# Patient Record
Sex: FEMALE | Race: WHITE | NOT HISPANIC OR LATINO | Employment: UNEMPLOYED | URBAN - METROPOLITAN AREA
[De-identification: names, ages, dates, MRNs, and addresses within clinical notes are randomized per-mention and may not be internally consistent; named-entity substitution may affect disease eponyms.]

---

## 2024-09-21 ENCOUNTER — APPOINTMENT (OUTPATIENT)
Dept: RADIOLOGY | Facility: HOSPITAL | Age: 24
End: 2024-09-21
Payer: COMMERCIAL

## 2024-09-21 ENCOUNTER — HOSPITAL ENCOUNTER (EMERGENCY)
Facility: HOSPITAL | Age: 24
Discharge: HOME | End: 2024-09-21
Payer: COMMERCIAL

## 2024-09-21 VITALS
SYSTOLIC BLOOD PRESSURE: 134 MMHG | DIASTOLIC BLOOD PRESSURE: 85 MMHG | BODY MASS INDEX: 36.8 KG/M2 | HEIGHT: 62 IN | OXYGEN SATURATION: 98 % | WEIGHT: 200 LBS | HEART RATE: 80 BPM | TEMPERATURE: 96.8 F | RESPIRATION RATE: 16 BRPM

## 2024-09-21 DIAGNOSIS — K80.20 CALCULUS OF GALLBLADDER WITHOUT CHOLECYSTITIS WITHOUT OBSTRUCTION: ICD-10-CM

## 2024-09-21 DIAGNOSIS — K76.0 FATTY LIVER: ICD-10-CM

## 2024-09-21 DIAGNOSIS — R74.01 TRANSAMINITIS: Primary | ICD-10-CM

## 2024-09-21 LAB
ALBUMIN SERPL BCP-MCNC: 4.3 G/DL (ref 3.4–5)
ALP SERPL-CCNC: 122 U/L (ref 33–110)
ALT SERPL W P-5'-P-CCNC: 99 U/L (ref 7–45)
ANION GAP SERPL CALC-SCNC: 11 MMOL/L (ref 10–20)
APPEARANCE UR: CLEAR
AST SERPL W P-5'-P-CCNC: 95 U/L (ref 9–39)
BASOPHILS # BLD AUTO: 0 X10*3/UL (ref 0–0.1)
BASOPHILS NFR BLD AUTO: 0 %
BILIRUB SERPL-MCNC: 0.4 MG/DL (ref 0–1.2)
BILIRUB UR STRIP.AUTO-MCNC: NEGATIVE MG/DL
BUN SERPL-MCNC: 10 MG/DL (ref 6–23)
CALCIUM SERPL-MCNC: 9.1 MG/DL (ref 8.6–10.3)
CHLORIDE SERPL-SCNC: 104 MMOL/L (ref 98–107)
CO2 SERPL-SCNC: 25 MMOL/L (ref 21–32)
COLOR UR: ABNORMAL
CREAT SERPL-MCNC: 0.74 MG/DL (ref 0.5–1.05)
EGFRCR SERPLBLD CKD-EPI 2021: >90 ML/MIN/1.73M*2
EOSINOPHIL # BLD AUTO: 0 X10*3/UL (ref 0–0.7)
EOSINOPHIL NFR BLD AUTO: 0 %
ERYTHROCYTE [DISTWIDTH] IN BLOOD BY AUTOMATED COUNT: 12.5 % (ref 11.5–14.5)
GLUCOSE SERPL-MCNC: 89 MG/DL (ref 74–99)
GLUCOSE UR STRIP.AUTO-MCNC: NORMAL MG/DL
HCG UR QL IA.RAPID: NEGATIVE
HCT VFR BLD AUTO: 38.7 % (ref 36–46)
HGB BLD-MCNC: 13.1 G/DL (ref 12–16)
IMM GRANULOCYTES # BLD AUTO: 0.01 X10*3/UL (ref 0–0.7)
IMM GRANULOCYTES NFR BLD AUTO: 0.2 % (ref 0–0.9)
KETONES UR STRIP.AUTO-MCNC: NEGATIVE MG/DL
LACTATE SERPL-SCNC: 0.6 MMOL/L (ref 0.4–2)
LEUKOCYTE ESTERASE UR QL STRIP.AUTO: NEGATIVE
LIPASE SERPL-CCNC: 14 U/L (ref 9–82)
LYMPHOCYTES # BLD AUTO: 1.42 X10*3/UL (ref 1.2–4.8)
LYMPHOCYTES NFR BLD AUTO: 23.6 %
MCH RBC QN AUTO: 28.5 PG (ref 26–34)
MCHC RBC AUTO-ENTMCNC: 33.9 G/DL (ref 32–36)
MCV RBC AUTO: 84 FL (ref 80–100)
MONOCYTES # BLD AUTO: 0.43 X10*3/UL (ref 0.1–1)
MONOCYTES NFR BLD AUTO: 7.1 %
NEUTROPHILS # BLD AUTO: 4.16 X10*3/UL (ref 1.2–7.7)
NEUTROPHILS NFR BLD AUTO: 69.1 %
NITRITE UR QL STRIP.AUTO: NEGATIVE
NRBC BLD-RTO: 0 /100 WBCS (ref 0–0)
PH UR STRIP.AUTO: 6.5 [PH]
PLATELET # BLD AUTO: 312 X10*3/UL (ref 150–450)
POTASSIUM SERPL-SCNC: 4.2 MMOL/L (ref 3.5–5.3)
PROT SERPL-MCNC: 7.5 G/DL (ref 6.4–8.2)
PROT UR STRIP.AUTO-MCNC: NEGATIVE MG/DL
RBC # BLD AUTO: 4.59 X10*6/UL (ref 4–5.2)
RBC # UR STRIP.AUTO: NEGATIVE /UL
SODIUM SERPL-SCNC: 136 MMOL/L (ref 136–145)
SP GR UR STRIP.AUTO: >1.05
UROBILINOGEN UR STRIP.AUTO-MCNC: NORMAL MG/DL
WBC # BLD AUTO: 6 X10*3/UL (ref 4.4–11.3)

## 2024-09-21 PROCEDURE — 83605 ASSAY OF LACTIC ACID: CPT | Performed by: NURSE PRACTITIONER

## 2024-09-21 PROCEDURE — 74177 CT ABD & PELVIS W/CONTRAST: CPT | Performed by: RADIOLOGY

## 2024-09-21 PROCEDURE — 99285 EMERGENCY DEPT VISIT HI MDM: CPT | Mod: 25

## 2024-09-21 PROCEDURE — 74177 CT ABD & PELVIS W/CONTRAST: CPT

## 2024-09-21 PROCEDURE — 96361 HYDRATE IV INFUSION ADD-ON: CPT

## 2024-09-21 PROCEDURE — 83690 ASSAY OF LIPASE: CPT | Performed by: NURSE PRACTITIONER

## 2024-09-21 PROCEDURE — 36415 COLL VENOUS BLD VENIPUNCTURE: CPT | Performed by: NURSE PRACTITIONER

## 2024-09-21 PROCEDURE — 76705 ECHO EXAM OF ABDOMEN: CPT | Performed by: RADIOLOGY

## 2024-09-21 PROCEDURE — 81003 URINALYSIS AUTO W/O SCOPE: CPT | Performed by: NURSE PRACTITIONER

## 2024-09-21 PROCEDURE — 76705 ECHO EXAM OF ABDOMEN: CPT

## 2024-09-21 PROCEDURE — 2500000004 HC RX 250 GENERAL PHARMACY W/ HCPCS (ALT 636 FOR OP/ED): Performed by: NURSE PRACTITIONER

## 2024-09-21 PROCEDURE — 96374 THER/PROPH/DIAG INJ IV PUSH: CPT | Mod: 59

## 2024-09-21 PROCEDURE — 96375 TX/PRO/DX INJ NEW DRUG ADDON: CPT

## 2024-09-21 PROCEDURE — 80053 COMPREHEN METABOLIC PANEL: CPT | Performed by: NURSE PRACTITIONER

## 2024-09-21 PROCEDURE — 85025 COMPLETE CBC W/AUTO DIFF WBC: CPT | Performed by: NURSE PRACTITIONER

## 2024-09-21 PROCEDURE — 81025 URINE PREGNANCY TEST: CPT | Performed by: NURSE PRACTITIONER

## 2024-09-21 PROCEDURE — 2550000001 HC RX 255 CONTRASTS: Performed by: NURSE PRACTITIONER

## 2024-09-21 RX ORDER — ONDANSETRON HYDROCHLORIDE 2 MG/ML
4 INJECTION, SOLUTION INTRAVENOUS ONCE
Status: COMPLETED | OUTPATIENT
Start: 2024-09-21 | End: 2024-09-21

## 2024-09-21 RX ORDER — SODIUM CHLORIDE, SODIUM LACTATE, POTASSIUM CHLORIDE, CALCIUM CHLORIDE 600; 310; 30; 20 MG/100ML; MG/100ML; MG/100ML; MG/100ML
1000 INJECTION, SOLUTION INTRAVENOUS ONCE
Status: COMPLETED | OUTPATIENT
Start: 2024-09-21 | End: 2024-09-21

## 2024-09-21 RX ORDER — MORPHINE SULFATE 2 MG/ML
2 INJECTION, SOLUTION INTRAMUSCULAR; INTRAVENOUS ONCE
Status: COMPLETED | OUTPATIENT
Start: 2024-09-21 | End: 2024-09-21

## 2024-09-21 ASSESSMENT — PAIN SCALES - GENERAL
PAINLEVEL_OUTOF10: 1
PAINLEVEL_OUTOF10: 0 - NO PAIN
PAINLEVEL_OUTOF10: 0 - NO PAIN

## 2024-09-21 ASSESSMENT — PAIN DESCRIPTION - LOCATION: LOCATION: ABDOMEN

## 2024-09-21 ASSESSMENT — LIFESTYLE VARIABLES
EVER HAD A DRINK FIRST THING IN THE MORNING TO STEADY YOUR NERVES TO GET RID OF A HANGOVER: NO
HAVE YOU EVER FELT YOU SHOULD CUT DOWN ON YOUR DRINKING: NO
HAVE PEOPLE ANNOYED YOU BY CRITICIZING YOUR DRINKING: NO
EVER FELT BAD OR GUILTY ABOUT YOUR DRINKING: NO
TOTAL SCORE: 0

## 2024-09-21 NOTE — ED PROVIDER NOTES
HPI   Chief Complaint   Patient presents with    Abdominal Pain       Patient is a 24-year-old female with past med history of obesity presents ED today due to right-sided abdominal pain.  Patient states been ongoing for the past, started after a meal in the evening and has been intermittent since that time.  Today it is just kind of a dull ache.  She is concerned it may be her gallbladder as she has several family members who have had their gallbladders removed due to gallstones.  Patient denies any nausea or vomiting.  She denies any diarrhea.  She does state that she has been constipated.  She denies any urinary symptoms, vaginal discharge or bleeding.  She denies any possibility pregnancy      History provided by:  Patient   used: No            Patient History   Past Medical History:   Diagnosis Date    Personal history of diseases of the blood and blood-forming organs and certain disorders involving the immune mechanism     History of anemia    Personal history of other mental and behavioral disorders     History of depression    Personal history of other specified conditions     History of headache     No past surgical history on file.  No family history on file.  Social History     Tobacco Use    Smoking status: Not on file    Smokeless tobacco: Not on file   Substance Use Topics    Alcohol use: Not on file    Drug use: Not on file       Physical Exam   ED Triage Vitals [09/21/24 1214]   Temperature Heart Rate Respirations BP   36 °C (96.8 °F) 100 18 148/90      Pulse Ox Temp src Heart Rate Source Patient Position   97 % -- -- --      BP Location FiO2 (%)     -- --       Physical Exam  Vitals and nursing note reviewed.   Constitutional:       General: She is not in acute distress.     Appearance: She is well-developed. She is obese.   HENT:      Head: Normocephalic and atraumatic.   Eyes:      Conjunctiva/sclera: Conjunctivae normal.   Cardiovascular:      Rate and Rhythm: Normal rate and  regular rhythm.      Heart sounds: No murmur heard.  Pulmonary:      Effort: Pulmonary effort is normal. No respiratory distress.      Breath sounds: Normal breath sounds.   Abdominal:      Palpations: Abdomen is soft.      Tenderness: There is abdominal tenderness in the right upper quadrant. There is no right CVA tenderness or left CVA tenderness. Negative signs include Atkins's sign.   Musculoskeletal:         General: No swelling.      Cervical back: Neck supple.   Skin:     General: Skin is warm and dry.      Capillary Refill: Capillary refill takes less than 2 seconds.   Neurological:      Mental Status: She is alert.   Psychiatric:         Mood and Affect: Mood normal.         ED Course & MDM   ED Course as of 09/21/24 1610   Sat Sep 21, 2024   1316 CBC and Auto Differential  CBC stable, no leukocytosis, anemia, or thrombocytopenia [WS]   1345 Lipase  Normal, unlikely pancreatitis [WS]   1346 Lactate  Not elevated [WS]   1346 Comprehensive Metabolic Panel(!)  No electrolyte abnormalities or kidney injury.  Elevated liver enzymes concern for possible liver pathology. [WS]   1441 CT abdomen pelvis w IV contrast  1. No acute intra-abdominal abnormality.      2. Component of constipation with moderate stool filled colon  throughout in particular proximal to mid colon.      3. Gallbladder is unremarkable   [WS]   1545 hCG, Urine, Qualitative  Negative [WS]   1555 Urinalysis with Reflex Culture and Microscopic(!)  Elevated specific gravity consistent with dehydration, no evidence of UTI [WS]   1606 US right upper quadrant  Cholelithiasis without evidence of acute cholecystitis. [WS]      ED Course User Index  [WS] Ralph Clark, APRN-CNP         Diagnoses as of 09/21/24 1610   Transaminitis   Fatty liver   Calculus of gallbladder without cholecystitis without obstruction                 No data recorded     [place] Coma Scale Score: 15 (09/21/24 1215 : Steven Suero, EMT)                           Medical  Decision Making  Differential diagnosis: Intra-abdominal faction, cholecystitis, viral gastroenteritis, constipation, bowel obstruction, appendicitis, UTI, pyelonephritis.  Patient's vital signs are stable.  Patient was given fluids, Zofran, and morphine with significant improvement in her pain.  Patient's urinalysis was negative for UTI does show her to be mildly dehydrated.  Urine pregnancy test was also negative.  Lactate was not elevated, lipase was not elevated, no evidence of pancreatitis.  CMP did show a slight transaminitis, no kidney injury or electrolyte abnormalities.  CBC was stable without leukocytosis, anemia or thrombocytopenia.  CT of the abdomen and pelvis with IV contrast demonstrates no acute intra-abdominal abnormality.  Constipation with moderate stool filled throughout the colon.  Gallbladder is unremarkable, however given her elevated liver enzymes I did obtain an ultrasound of the gallbladder which did show cholelithiasis without cholecystitis or obstruction.  Given patient's improvement in pain I did recommend a cholelithiasis diet and follow-up with general surgery.  For the fatty infiltrates in the liver I did also recommend right follow-up with GI.    Return to ED precautions were discussed with patient and patient verbalized understanding of these.      I discussed the differential, results and discharge plan with the patient.  I emphasized the importance of follow-up with the physician I referred them to in the timeframe recommended.  I explained reasons for the them to return to the Emergency Department. Additional verbal discharge instructions were also given and discussed with them to supplement those generated by the EMR. We also discussed medications that were prescribed (if any) and appropriate use of OTC medications including common side effects and interactions. All questions were addressed.  They understand return precautions and discharge instructions. They expressed  understanding.        Amount and/or Complexity of Data Reviewed  Labs: ordered. Decision-making details documented in ED Course.  Radiology: ordered and independent interpretation performed. Decision-making details documented in ED Course.    Risk  OTC drugs.  Prescription drug management.  Parenteral controlled substances.        Procedure  Procedures     Ralph Clark, BRIELLE-CNP  09/21/24 1268

## 2024-09-21 NOTE — ED TRIAGE NOTES
Pt comes to ED with c/o gallbladder issues x 1 week. Pt states she gets intermittent abdominal pain, nausea, sweating

## 2025-01-22 ENCOUNTER — HOSPITAL ENCOUNTER (EMERGENCY)
Facility: HOSPITAL | Age: 25
Discharge: HOME | End: 2025-01-22
Payer: COMMERCIAL

## 2025-01-22 VITALS
OXYGEN SATURATION: 98 % | HEART RATE: 102 BPM | WEIGHT: 204 LBS | DIASTOLIC BLOOD PRESSURE: 81 MMHG | RESPIRATION RATE: 16 BRPM | BODY MASS INDEX: 37.31 KG/M2 | TEMPERATURE: 97.3 F | SYSTOLIC BLOOD PRESSURE: 138 MMHG

## 2025-01-22 DIAGNOSIS — S61.412A LACERATION OF LEFT HAND WITHOUT FOREIGN BODY, INITIAL ENCOUNTER: Primary | ICD-10-CM

## 2025-01-22 PROCEDURE — 99283 EMERGENCY DEPT VISIT LOW MDM: CPT

## 2025-01-22 PROCEDURE — 12002 RPR S/N/AX/GEN/TRNK2.6-7.5CM: CPT | Performed by: PHYSICIAN ASSISTANT

## 2025-01-22 ASSESSMENT — COLUMBIA-SUICIDE SEVERITY RATING SCALE - C-SSRS
1. IN THE PAST MONTH, HAVE YOU WISHED YOU WERE DEAD OR WISHED YOU COULD GO TO SLEEP AND NOT WAKE UP?: NO
6. HAVE YOU EVER DONE ANYTHING, STARTED TO DO ANYTHING, OR PREPARED TO DO ANYTHING TO END YOUR LIFE?: NO
2. HAVE YOU ACTUALLY HAD ANY THOUGHTS OF KILLING YOURSELF?: NO

## 2025-01-22 NOTE — ED PROVIDER NOTES
Limitations to History: None   External Records Reviewed  Independent Historians: self  Social determinants affecting care: None    HPI  Luz Maria Broderick is a 24 y.o. female who presents to the emergency department due to left hand laceration just prior to the ER visit.  She reports that she was opening a can and the top of the lid cut her left hand.  She is right-hand dominant.  She reports that she has very minimal burning pain.  She is up-to-date on her tetanus immunization.  She denies any loss of function of the fingers, numbness, or tingling.  She denies any other injuries.  She has no further complaints.    [facility]  Past Medical History:   Diagnosis Date    Personal history of diseases of the blood and blood-forming organs and certain disorders involving the immune mechanism     History of anemia    Personal history of other mental and behavioral disorders     History of depression    Personal history of other specified conditions     History of headache    reviewed by myself.    Meds  No current outpatient medications    Allergies  No Known Allergies reviewed by myself.    SHx  Social History     Tobacco Use    Smoking status: Never    Smokeless tobacco: Never    reviewed by myself.      ------------------------------------------------------------------------------------------------------------------------------------------    /81 (BP Location: Right arm, Patient Position: Sitting)   Pulse (!) 102   Temp 36.3 °C (97.3 °F) (Temporal)   Resp 16   Wt 92.5 kg (204 lb)   LMP 01/15/2025 (Approximate)   SpO2 98%   BMI 37.31 kg/m²     Physical Exam  Vitals and nursing note reviewed.   Constitutional:       Appearance: Normal appearance. She is normal weight.   HENT:      Head: Atraumatic.      Nose: Nose normal.      Mouth/Throat:      Mouth: Mucous membranes are moist.   Eyes:      Extraocular Movements: Extraocular movements intact.      Conjunctiva/sclera: Conjunctivae normal.   Cardiovascular:      Rate  and Rhythm: Normal rate and regular rhythm.   Pulmonary:      Effort: Pulmonary effort is normal.      Breath sounds: Normal breath sounds.   Musculoskeletal:         General: Normal range of motion.      Cervical back: Neck supple.      Comments: Full active range of motion of the digit of the left hand with extension and flexion.  Strength the digit of the left hand +5/5 with extension and flexion.  Radial pulse intact.   Skin:     General: Skin is warm and dry.      Comments: Linear 1.5 cm laceration to the palmar surface of the left hand.  Minimal bleeding noted.  No tendon or arterial injury.  Capillary refill is brisk.  Sensation intact distally.   Neurological:      Mental Status: She is alert and oriented to person, place, and time.   Psychiatric:         Mood and Affect: Mood normal.          ------------------------------------------------------------------------------------------------------------------------------------------  Labs  Labs Reviewed - No data to display     Imaging  No orders to display        ED Course  Diagnoses as of 01/22/25 1132   Laceration of left hand without foreign body, initial encounter        Medical Decision Making: She did not appear ill or toxic.  Vital signs reviewed.  She slightly tachycardic.  Otherwise hemodynamically stable.  Wound was cleaned and explored.  No foreign body.  No tendon or arterial injury.  Sutures were placed.  No complications.  I discussed with her to keep the wound clean and dry.  She is to have stitches removed in 7 days.  She was educated on signs symptoms of infection.  She is to return to ER immediately if symptoms change or worsen.  She verbalized understanding and agreed to plan of care.  She was discharged in stable condition.    Diagnosis: Hand laceration  Plan: Discharge     Eligio Acosta PA-C  01/22/25 1132

## 2025-01-22 NOTE — ED PROCEDURE NOTE
Procedure  Laceration Repair    Performed by: Eligio Acosta PA-C  Authorized by: Eligio Acosta PA-C    Consent:     Consent obtained:  Verbal    Consent given by:  Patient    Risks, benefits, and alternatives were discussed: yes      Risks discussed:  Infection, pain, poor cosmetic result and poor wound healing  Universal protocol:     Procedure explained and questions answered to patient or proxy's satisfaction: yes      Relevant documents present and verified: yes      Test results available: yes      Patient identity confirmed:  Verbally with patient  Anesthesia:     Anesthesia method:  Local infiltration    Local anesthetic:  Lidocaine 1% w/o epi  Laceration details:     Location:  Hand    Hand location:  L palm    Length (cm):  4.5  Pre-procedure details:     Preparation:  Patient was prepped and draped in usual sterile fashion  Exploration:     Limited defect created (wound extended): no      Hemostasis achieved with:  Direct pressure    Imaging outcome: foreign body not noted      Wound exploration: wound explored through full range of motion      Contaminated: no    Treatment:     Area cleansed with:  Chlorhexidine and saline    Amount of cleaning:  Standard    Irrigation solution:  Sterile saline    Debridement:  None    Undermining:  None  Skin repair:     Repair method:  Sutures    Suture size:  4-0    Suture material:  Nylon    Suture technique:  Simple interrupted    Number of sutures:  9  Approximation:     Approximation:  Close  Repair type:     Repair type:  Simple  Post-procedure details:     Dressing:  Non-adherent dressing    Procedure completion:  Tolerated well, no immediate complications               Eligio Acosta PA-C  01/22/25 1130

## 2025-01-22 NOTE — DISCHARGE INSTRUCTIONS
Keep wound clean and dry.  Look for signs of infection as we discussed.  Have the stitches removed in 7 days.  Return to ER immediately if symptoms change or worsen

## 2025-01-26 ENCOUNTER — HOSPITAL ENCOUNTER (EMERGENCY)
Facility: HOSPITAL | Age: 25
Discharge: LEFT BEFORE DISCHARGE | End: 2025-01-26
Payer: COMMERCIAL

## 2025-01-26 PROCEDURE — 4500999001 HC ED NO CHARGE

## 2025-01-30 ENCOUNTER — HOSPITAL ENCOUNTER (EMERGENCY)
Facility: HOSPITAL | Age: 25
Discharge: HOME | End: 2025-01-30
Attending: STUDENT IN AN ORGANIZED HEALTH CARE EDUCATION/TRAINING PROGRAM
Payer: COMMERCIAL

## 2025-01-30 VITALS
TEMPERATURE: 97.9 F | WEIGHT: 200 LBS | OXYGEN SATURATION: 98 % | SYSTOLIC BLOOD PRESSURE: 136 MMHG | HEART RATE: 79 BPM | DIASTOLIC BLOOD PRESSURE: 76 MMHG | RESPIRATION RATE: 16 BRPM | BODY MASS INDEX: 36.8 KG/M2 | HEIGHT: 62 IN

## 2025-01-30 DIAGNOSIS — Z48.89 SUTURE CHECK: Primary | ICD-10-CM

## 2025-01-30 PROCEDURE — 99281 EMR DPT VST MAYX REQ PHY/QHP: CPT | Performed by: STUDENT IN AN ORGANIZED HEALTH CARE EDUCATION/TRAINING PROGRAM

## 2025-01-30 ASSESSMENT — PAIN SCALES - GENERAL
PAINLEVEL_OUTOF10: 0 - NO PAIN
PAINLEVEL_OUTOF10: 0 - NO PAIN

## 2025-01-30 ASSESSMENT — PAIN - FUNCTIONAL ASSESSMENT
PAIN_FUNCTIONAL_ASSESSMENT: 0-10
PAIN_FUNCTIONAL_ASSESSMENT: 0-10

## 2025-01-30 ASSESSMENT — LIFESTYLE VARIABLES
TOTAL SCORE: 0
HAVE PEOPLE ANNOYED YOU BY CRITICIZING YOUR DRINKING: NO
HAVE YOU EVER FELT YOU SHOULD CUT DOWN ON YOUR DRINKING: NO
EVER FELT BAD OR GUILTY ABOUT YOUR DRINKING: NO
EVER HAD A DRINK FIRST THING IN THE MORNING TO STEADY YOUR NERVES TO GET RID OF A HANGOVER: NO

## 2025-01-30 ASSESSMENT — COLUMBIA-SUICIDE SEVERITY RATING SCALE - C-SSRS
2. HAVE YOU ACTUALLY HAD ANY THOUGHTS OF KILLING YOURSELF?: NO
6. HAVE YOU EVER DONE ANYTHING, STARTED TO DO ANYTHING, OR PREPARED TO DO ANYTHING TO END YOUR LIFE?: NO
1. IN THE PAST MONTH, HAVE YOU WISHED YOU WERE DEAD OR WISHED YOU COULD GO TO SLEEP AND NOT WAKE UP?: NO

## 2025-01-31 NOTE — ED PROVIDER NOTES
EMERGENCY DEPARTMENT ENCOUNTER      Pt Name: Luz Maria Broderick  MRN: [id number]  Birthdate 2000  Date of evaluation: 1/30/2025  Provider: Elkin Alvarado DO    CHIEF COMPLAINT       Chief Complaint   Patient presents with    Wound Check     PT. STATES GOT STITCHES TO LEFT HAND WEEK AGO WEDNESDAY AFTER CUTTING IT ON A METAL CAN LID. PT. STATES IT GOT INFECTED OVER THE WEEKEND, GOT ANTIBX FROM URGENT CARE. PT. STATES STITCHES OPENED TO ONE END OF WOUND COUPLE DAYS AGO.        HISTORY OF PRESENT ILLNESS    Luz Maria Broderick is a 24 y.o. female who presents to the emergency department with Her self for suture concern.  Patient states that she suffered a laceration to the palm of her left hand days prior had it repaired with sutures and was subsequently placed on antibiotic prophylaxis with Keflex for which she has been compliant with.  There are no signs of infection today.  She notes dehiscence of the wound itself although still well-healing.  No further related symptoms at this time.          Nursing Notes were reviewed.    REVIEW OF SYSTEMS     CONSTITUTIONAL: Denies fever, sweats, chills.   NEURO: Denies difficulty walking, numbness, weakness, tingling, headache.   HEENT: Denies sore throat, rhinorrhea, changes in vision.   CARDIO: Denies chest pain, palpitations.  PULM: Denies shortness of breath, cough.   GI: Denies abdominal pain, nausea, vomiting  MSK: Denies recent trauma.   SKIN: Endorses dehiscence of wound.  ENDOCRINE: Denies unexpected weight-loss.   HEME: Denies bleeding disorder.     PAST MEDICAL HISTORY     Past Medical History:   Diagnosis Date    Personal history of diseases of the blood and blood-forming organs and certain disorders involving the immune mechanism     History of anemia    Personal history of other mental and behavioral disorders     History of depression    Personal history of other specified conditions     History of headache       SURGICAL HISTORY     History reviewed. No pertinent  surgical history.    ALLERGIES     Patient has no known allergies.    FAMILY HISTORY     No family history on file.     SOCIAL HISTORY       Social History     Socioeconomic History    Marital status: Single   Tobacco Use    Smoking status: Never    Smokeless tobacco: Never   Vaping Use    Vaping status: Never Used   Substance and Sexual Activity    Alcohol use: Not Currently    Drug use: Not Currently     Social Drivers of Health     Financial Resource Strain: Low Risk  (12/30/2024)    Received from [facility]    Overall Financial Resource Strain (CARDIA)     Difficulty of Paying Living Expenses: Not hard at all   Food Insecurity: No Food Insecurity (12/30/2024)    Received from [facility]    Hunger Vital Sign     Worried About Running Out of Food in the Last Year: Never true     Ran Out of Food in the Last Year: Never true   Transportation Needs: No Transportation Needs (12/30/2024)    Received from [facility]    PRAPARE - Transportation     Lack of Transportation (Medical): No     Lack of Transportation (Non-Medical): No   Physical Activity: Sufficiently Active (12/30/2024)    Received from [facility]    Exercise Vital Sign     Days of Exercise per Week: 5 days     Minutes of Exercise per Session: 60 min   Stress: Stress Concern Present (12/30/2024)    Received from [facility]    [place] [employer] - Occupational Stress Questionnaire     Feeling of Stress : Very much   Social Connections: Unknown (12/30/2024)    Received from [facility]    Social Connection and Isolation Panel [NHANES]     Frequency of Communication with Friends and Family: Three times a week     Frequency of Social Gatherings with Friends and Family: Once a week     Attends Hindu Services: Patient declined     Active Member of Clubs or Organizations: Yes     Attends Club or Organization Meetings: More than 4 times per year     Marital Status: Living with partner   Housing Stability:  "Unknown (12/30/2024)    Received from [facility]    Housing Stability Vital Sign     Unable to Pay for Housing in the Last Year: No       PHYSICAL EXAM   VS: As documented in the triage note from today's date and EMR flowsheet were reviewed.  Gen: Well developed. No acute distress. Seated in bed. Appears nontoxic.   Skin: Warm. Dry. Intact. No rashes or lesions.  Well-healing laceration no signs of infection left palmar aspect of hand.  Minimal dehiscence although well-healing granular tissue beneath.  Eyes: Pupils equally round and reactive to light. Clear sclera.   HENT: Atraumatic appearance. Mucosal membranes moist. No oral lesions, uvula midline, airway patent.   CV: Regular rate and regular rhythm. S1, S2. No pedal edema. Warm extremities.  Resp: Nonlabored breathing Clear to auscultation bilaterally. No increased work of breathing.   GI: Soft and nontender. No rebound or guarding. Bowel sounds x4 present.   MSK: Symmetric muscle bulk. No joint swelling in the extremities. Compartments are soft. Neurovascularly intact x4 extremities. Radial pulses +2 equal bilaterally.   Neuro: Alert. Speech fluent. Moving all extremities. No focal deficits. Gait normal.  Psych: Appropriate. Kempt.    DIAGNOSTIC RESULTS   RADIOLOGY:     No orders to display         ED BEDSIDE ULTRASOUND:   Performed by ED Physician - none    LABS:  Labs Reviewed - No data to display    All other labs were within normal range or not returned as of this dictation.    EMERGENCY DEPARTMENT COURSE/MDM:   Vitals:    Vitals:    01/30/25 1905 01/30/25 1958 01/2000   BP: 142/83  136/76   BP Location: Right arm  Right arm   Patient Position: Sitting  Sitting   Pulse: 88  79   Resp: 16  16   Temp: 36.5 °C (97.7 °F)  36.6 °C (97.9 °F)   TempSrc: Temporal  Temporal   SpO2: 97% 98% 98%   Weight: 90.7 kg (200 lb)     Height: 1.575 m (5' 2\")         I reviewed the patient's triage vitals and they are hypertensive recommend follow-up with primary " physician for repeat checks.    Due to the above findings the following was ordered splinting.    Wound was thoroughly inspected there were no signs of infection at this time.  There is minimal dehiscence of the wound on the lateral aspect although appropriate granular tissue beneath no active signs of bleeding.  Patient is aware that the sutures need to still be removed in 10 to 14 days.  She was splinted with a volar splint by nursing staff neurovascular intact both before and after to provide limited range of motion to allow the wound itself to heal.  She is recommended to follow-up with her primary provider in the coming days for repeat evaluation and suture removal.  She is appreciative of care.    Diagnoses as of 01/31/25 0017   Suture check       Patient was counseled regarding labs, imaging, likely diagnosis, and plan. All questions were answered.     ------------------------------------------------------------------  Information provided by the patient  Previous medical records reviewed office visit 1/27/2025  ------------------------------------------------------------------  ED Medications administered this visit:  Medications - No data to display    New Prescriptions from this visit:    There are no discharge medications for this patient.      Follow-up:  Caitlin Curry MD  [address]  [address]  [facility] [address] [phone]    Schedule an appointment as soon as possible for a visit in 2 days      [facility]  [address] [facility] [address] [phone]  Go to   If symptoms worsen        Final Impression:   1. Suture check          Elkin Alvarado DO    (Please note that portions of this note were completed with a voice recognition program.  Efforts were made to edit the dictations but occasionally words are mis-transcribed.)     Elkin Alvarado DO  01/31/25 0020

## 2025-01-31 NOTE — DISCHARGE INSTRUCTIONS
As discussed please keep the splint in place until you are ready to have your sutures removed.   Should you begin experiencing redness purulence pain out of proportion numbness tingling weakness symptoms concerning to call 911 or return to the nearest emergency department immediately.  Otherwise have the sutures removed at your regularly scheduled appointment between 10 and 14 days of placement.

## 2025-01-31 NOTE — ED TRIAGE NOTES
PT. STATES GOT STITCHES TO LEFT HAND WEEK AGO WEDNESDAY AFTER CUTTING IT ON A METAL CAN LID. PT. STATES IT GOT INFECTED OVER THE WEEKEND, GOT ANTIBX FROM URGENT CARE. PT. STATES STITCHES OPENED TO ONE END OF WOUND COUPLE DAYS AGO.

## 2025-02-03 ENCOUNTER — OFFICE VISIT (OUTPATIENT)
Dept: URGENT CARE | Age: 25
End: 2025-02-03
Payer: COMMERCIAL

## 2025-02-03 VITALS
HEART RATE: 116 BPM | TEMPERATURE: 97 F | DIASTOLIC BLOOD PRESSURE: 77 MMHG | RESPIRATION RATE: 20 BRPM | OXYGEN SATURATION: 97 % | SYSTOLIC BLOOD PRESSURE: 117 MMHG

## 2025-02-03 DIAGNOSIS — Z48.02 VISIT FOR SUTURE REMOVAL: Primary | ICD-10-CM

## 2025-02-03 RX ORDER — CEPHALEXIN 500 MG/1
500 CAPSULE ORAL 2 TIMES DAILY
COMMUNITY
Start: 2025-01-27 | End: 2025-02-03

## 2025-02-03 NOTE — PROGRESS NOTES
Suture Removal    Date/Time: 2/3/2025 1:32 PM    Performed by: Kayy Jimenez MA  Authorized by: KEAGAN Rodriguez    Consent:     Consent obtained:  Verbal    Consent given by:  Patient  Universal protocol:     Patient identity confirmed:  Verbally with patient  Location:     Location:  Upper extremity  Procedure details:     Wound appearance:  Clean    Number of sutures removed:  9  Post-procedure details:     Procedure completion:  Tolerated well, no immediate complications